# Patient Record
Sex: FEMALE | Race: WHITE
[De-identification: names, ages, dates, MRNs, and addresses within clinical notes are randomized per-mention and may not be internally consistent; named-entity substitution may affect disease eponyms.]

---

## 2017-01-02 ENCOUNTER — HOSPITAL ENCOUNTER (EMERGENCY)
Age: 72
Discharge: HOME | End: 2017-01-02

## 2017-01-02 PROCEDURE — 94640 AIRWAY INHALATION TREATMENT: CPT

## 2017-01-02 PROCEDURE — 99283 EMERGENCY DEPT VISIT LOW MDM: CPT

## 2017-01-12 ENCOUNTER — HOSPITAL ENCOUNTER (OUTPATIENT)
Age: 72
Discharge: HOME | End: 2017-01-12
Payer: MEDICARE

## 2017-01-12 DIAGNOSIS — R05: Primary | ICD-10-CM

## 2017-04-27 ENCOUNTER — HOSPITAL ENCOUNTER (OUTPATIENT)
Age: 72
Discharge: HOME | End: 2017-04-27
Payer: MEDICARE

## 2017-04-27 DIAGNOSIS — Z82.61: ICD-10-CM

## 2017-04-27 DIAGNOSIS — M19.90: ICD-10-CM

## 2017-04-27 DIAGNOSIS — H25.11: Primary | ICD-10-CM

## 2017-04-27 DIAGNOSIS — Z82.3: ICD-10-CM

## 2017-04-27 PROCEDURE — 66984 XCAPSL CTRC RMVL W/O ECP: CPT

## 2017-04-27 PROCEDURE — 08RJ3JZ REPLACEMENT OF RIGHT LENS WITH SYNTHETIC SUBSTITUTE, PERCUTANEOUS APPROACH: ICD-10-PCS | Performed by: OPHTHALMOLOGY

## 2017-05-11 ENCOUNTER — HOSPITAL ENCOUNTER (OUTPATIENT)
Age: 72
Discharge: HOME | End: 2017-05-11
Payer: MEDICARE

## 2017-05-11 DIAGNOSIS — K90.0: ICD-10-CM

## 2017-05-11 DIAGNOSIS — H25.12: Primary | ICD-10-CM

## 2017-05-11 DIAGNOSIS — M19.90: ICD-10-CM

## 2017-05-11 DIAGNOSIS — Z90.710: ICD-10-CM

## 2017-05-11 PROCEDURE — 66984 XCAPSL CTRC RMVL W/O ECP: CPT

## 2017-05-11 PROCEDURE — 08RK3JZ REPLACEMENT OF LEFT LENS WITH SYNTHETIC SUBSTITUTE, PERCUTANEOUS APPROACH: ICD-10-PCS | Performed by: OPHTHALMOLOGY

## 2017-06-21 ENCOUNTER — HOSPITAL ENCOUNTER (OUTPATIENT)
Dept: HOSPITAL 76 - DI | Age: 72
Discharge: HOME | End: 2017-06-21
Attending: FAMILY MEDICINE
Payer: MEDICARE

## 2017-06-21 DIAGNOSIS — M85.88: Primary | ICD-10-CM

## 2017-06-21 PROCEDURE — 77080 DXA BONE DENSITY AXIAL: CPT

## 2017-06-21 PROCEDURE — 77081 DXA BONE DENSITY APPENDICULR: CPT

## 2017-06-22 NOTE — DEXA REPORT
DEXA BONE MINERAL DENSITY SCAN: 06/21/2017



CLINICAL HISTORY: A 72-year-old postmenopausal female. 



TECHNIQUE: Dual energy x-ray absorptiometry (DXA) was performed on a [employer] 
system. Regions measured are the AP spine, femoral neck, and, if needed, 
forearm.



COMPARISON: None. In accordance with the International Society for Clinical 
Densitometry (ISCD) guidelines, data from previous exams may be reanalyzed 
using current recommendations and techniques. This is done to allow a more 
accurate basis for comparison with the current study.



The data for the hip is as follows:









REGION BMD (g/cm/cm) T-SCORE Z-SCORE

 

Neck 0.743 -2.1 -0.4

 

TOTAL 0.842 -1.3 0.2





NOTE: The femoral neck or total proximal femur, whichever is lowest, is used 
for classification.



The data for the forearm is as follows:







REGION BMD (g/cm/cm) T-SCORE Z-SCORE

 

1/3 0.868 -0.1 2.0





NOTE: The 33% radius of the nondominant forearm is used for classification.



THE FEMORAL NECK T-SCORE IS -2.1. THIS IS COMPATIBLE WITH OSTEOPENIA ACCORDING 
TO THE WORLD HEALTH ORGANIZATION GUIDELINES.



TOTAL HIP T-SCORE IS -1.3. THIS IS COMPATIBLE WITH OSTEOPENIA ACCORDING TO THE 
WORLD HEALTH ORGANIZATION GUIDELINES. 



THE RADIUS UD T-SCORE IS -2.1. THIS INDICATES OSTEOPENIA ACCORDING TO THE WORLD 
HEALTH ORGANIZATION GUIDELINES.





IMPRESSION: 



THE WHO CLASSIFICATION BASED ON THE INTERNATIONAL REFERENCE STANDARD IS 
OSTEOPENIA. THE PATIENT HAS INCREASED FRACTURE RISK. 



RECOMMENDATION: Patients with diagnosis of osteoporosis or osteopenia should 
have regular bone mineral density assessment. For those eligible for Medicare, 
routine testing is allowed once every 2 years. Testing frequency can be 
increased for patients who have rapidly progressing disease or for those who 
are receiving medical therapy to restore bone mass. 



COMMENT: World Health Organization (WHO) definitions for osteoporosis and 
osteopenia:

NORMAL BMD: T-score at -1.0 or higher, fracture risk is low.

OSTEOPENIA BMD: T-score between -1.0 and -2.5, fracture risk is increased.

OSTEOPOROSIS BMD: T-score at -2.5 or lower, fracture risk high.



National Osteoporosis Foundation recommends:

1. Obtain adequate dietary calcium (at least 1200 mg per day) and vitamin D (400
-800 international units per day).

2. Participate, as appropriate, in regular weightbearing and muscle-
strengthening exercise. 

3. Avoid tobacco use and reduce alcohol and caffeine intake. 

4. For more detailed information see the website at www.NOF.org.
MTDD

## 2017-07-05 ENCOUNTER — HOSPITAL ENCOUNTER (OUTPATIENT)
Age: 72
Discharge: HOME | End: 2017-07-05
Payer: MEDICARE

## 2017-07-05 DIAGNOSIS — Z12.31: Primary | ICD-10-CM

## 2017-12-13 ENCOUNTER — HOSPITAL ENCOUNTER (OUTPATIENT)
Dept: HOSPITAL 76 - LAB.R | Age: 72
Discharge: HOME | End: 2017-12-13
Attending: FAMILY MEDICINE
Payer: MEDICARE

## 2017-12-13 DIAGNOSIS — N39.0: Primary | ICD-10-CM

## 2017-12-13 PROCEDURE — 87086 URINE CULTURE/COLONY COUNT: CPT

## 2018-01-09 ENCOUNTER — HOSPITAL ENCOUNTER (OUTPATIENT)
Dept: HOSPITAL 76 - DI | Age: 73
Discharge: HOME | End: 2018-01-09
Attending: FAMILY MEDICINE
Payer: MEDICARE

## 2018-01-09 DIAGNOSIS — R13.10: Primary | ICD-10-CM

## 2018-01-09 PROCEDURE — 74230 X-RAY XM SWLNG FUNCJ C+: CPT

## 2018-01-09 PROCEDURE — 92611 MOTION FLUOROSCOPY/SWALLOW: CPT

## 2018-01-09 NOTE — XRAY REPORT
DATE OF SERVICE: 01/09/2018

 

MODIFIED BARIUM SWALLOW:  01/09/2018

 

CLINICAL INDICATION:  Dysphagia.

 

Various consistencies of barium were prepared and administered in conjunction with Speech Pathology. 
 There 

was no evidence of penetration or aspiration with any administered consistency.  Please also refer to
 full 

report from Speech Pathology.

 

IMPRESSION:  No evidence of penetration or aspiration.

 

FLUOROSCOPY TIME:  45 seconds; 1 spot image obtained (cine fluoroscopy recorded).

 

 

DD: 01/09/2018 10:20

TD: 01/09/2018 19:36

Job #: [id number]

## 2018-09-06 ENCOUNTER — HOSPITAL ENCOUNTER (EMERGENCY)
Dept: HOSPITAL 76 - ED | Age: 73
Discharge: HOME | End: 2018-09-06
Payer: MEDICARE

## 2018-09-06 VITALS — SYSTOLIC BLOOD PRESSURE: 124 MMHG | DIASTOLIC BLOOD PRESSURE: 66 MMHG

## 2018-09-06 DIAGNOSIS — Z87.891: ICD-10-CM

## 2018-09-06 DIAGNOSIS — M26.69: Primary | ICD-10-CM

## 2018-09-06 PROCEDURE — 99283 EMERGENCY DEPT VISIT LOW MDM: CPT

## 2019-01-02 ENCOUNTER — HOSPITAL ENCOUNTER (OUTPATIENT)
Dept: HOSPITAL 76 - LAB.WCP | Age: 74
Discharge: HOME | End: 2019-01-02
Attending: FAMILY MEDICINE
Payer: MEDICARE

## 2019-01-02 DIAGNOSIS — F41.9: ICD-10-CM

## 2019-01-02 DIAGNOSIS — K90.0: Primary | ICD-10-CM

## 2019-01-02 DIAGNOSIS — R20.0: ICD-10-CM

## 2019-01-02 LAB
ALBUMIN DIAFP-MCNC: 4.3 G/DL (ref 3.2–5.5)
ALBUMIN/GLOB SERPL: 1.5 {RATIO} (ref 1–2.2)
ALP SERPL-CCNC: 77 IU/L (ref 42–121)
ALT SERPL W P-5'-P-CCNC: 33 IU/L (ref 10–60)
ANION GAP SERPL CALCULATED.4IONS-SCNC: 8 MMOL/L (ref 6–13)
AST SERPL W P-5'-P-CCNC: 31 IU/L (ref 10–42)
BASOPHILS NFR BLD AUTO: 0.1 10^3/UL (ref 0–0.1)
BASOPHILS NFR BLD AUTO: 1.2 %
BILIRUB BLD-MCNC: 0.9 MG/DL (ref 0.2–1)
BUN SERPL-MCNC: 23 MG/DL (ref 6–20)
CALCIUM UR-MCNC: 9.2 MG/DL (ref 8.5–10.3)
CHLORIDE SERPL-SCNC: 107 MMOL/L (ref 101–111)
CO2 SERPL-SCNC: 25 MMOL/L (ref 21–32)
CREAT SERPLBLD-SCNC: 0.8 MG/DL (ref 0.4–1)
EOSINOPHIL # BLD AUTO: 0.4 10^3/UL (ref 0–0.7)
EOSINOPHIL NFR BLD AUTO: 6.2 %
ERYTHROCYTE [DISTWIDTH] IN BLOOD BY AUTOMATED COUNT: 13.7 % (ref 12–15)
GFRSERPLBLD MDRD-ARVRAT: 70 ML/MIN/{1.73_M2} (ref 89–?)
GLOBULIN SER-MCNC: 2.8 G/DL (ref 2.1–4.2)
GLUCOSE SERPL-MCNC: 98 MG/DL (ref 70–100)
HGB UR QL STRIP: 15.2 G/DL (ref 12–16)
LYMPHOCYTES # SPEC AUTO: 1.5 10^3/UL (ref 1.5–3.5)
LYMPHOCYTES NFR BLD AUTO: 23.4 %
MCH RBC QN AUTO: 32.3 PG (ref 27–31)
MCHC RBC AUTO-ENTMCNC: 33.7 G/DL (ref 32–36)
MCV RBC AUTO: 95.9 FL (ref 81–99)
MONOCYTES # BLD AUTO: 0.7 10^3/UL (ref 0–1)
MONOCYTES NFR BLD AUTO: 10.5 %
NEUTROPHILS # BLD AUTO: 3.7 10^3/UL (ref 1.5–6.6)
NEUTROPHILS # SNV AUTO: 6.3 X10^3/UL (ref 4.8–10.8)
NEUTROPHILS NFR BLD AUTO: 58.7 %
PDW BLD AUTO: 8.9 FL (ref 7.9–10.8)
PLATELET # BLD: 228 10^3/UL (ref 130–450)
PROT SPEC-MCNC: 7.1 G/DL (ref 6.7–8.2)
RBC MAR: 4.72 10^6/UL (ref 4.2–5.4)
SODIUM SERPLBLD-SCNC: 140 MMOL/L (ref 135–145)

## 2019-01-02 PROCEDURE — 80053 COMPREHEN METABOLIC PANEL: CPT

## 2019-01-02 PROCEDURE — 85025 COMPLETE CBC W/AUTO DIFF WBC: CPT

## 2019-01-02 PROCEDURE — 36415 COLL VENOUS BLD VENIPUNCTURE: CPT

## 2019-01-02 PROCEDURE — 84443 ASSAY THYROID STIM HORMONE: CPT

## 2019-01-15 ENCOUNTER — HOSPITAL ENCOUNTER (EMERGENCY)
Dept: HOSPITAL 76 - ED | Age: 74
Discharge: HOME | End: 2019-01-15
Payer: MEDICARE

## 2019-01-15 VITALS — DIASTOLIC BLOOD PRESSURE: 65 MMHG | SYSTOLIC BLOOD PRESSURE: 121 MMHG

## 2019-01-15 DIAGNOSIS — T78.40XA: Primary | ICD-10-CM

## 2019-01-15 LAB
ALBUMIN DIAFP-MCNC: 4 G/DL (ref 3.2–5.5)
ALBUMIN/GLOB SERPL: 1.4 {RATIO} (ref 1–2.2)
ALP SERPL-CCNC: 64 IU/L (ref 42–121)
ALT SERPL W P-5'-P-CCNC: 24 IU/L (ref 10–60)
ANION GAP SERPL CALCULATED.4IONS-SCNC: 9 MMOL/L (ref 6–13)
AST SERPL W P-5'-P-CCNC: 29 IU/L (ref 10–42)
BASOPHILS NFR BLD AUTO: 0 10^3/UL (ref 0–0.1)
BASOPHILS NFR BLD AUTO: 0.2 %
BILIRUB BLD-MCNC: 0.8 MG/DL (ref 0.2–1)
BUN SERPL-MCNC: 18 MG/DL (ref 6–20)
CALCIUM UR-MCNC: 9 MG/DL (ref 8.5–10.3)
CHLORIDE SERPL-SCNC: 107 MMOL/L (ref 101–111)
CO2 SERPL-SCNC: 22 MMOL/L (ref 21–32)
CREAT SERPLBLD-SCNC: 0.8 MG/DL (ref 0.4–1)
EOSINOPHIL # BLD AUTO: 0 10^3/UL (ref 0–0.7)
EOSINOPHIL NFR BLD AUTO: 0.3 %
ERYTHROCYTE [DISTWIDTH] IN BLOOD BY AUTOMATED COUNT: 14 % (ref 12–15)
GFRSERPLBLD MDRD-ARVRAT: 70 ML/MIN/{1.73_M2} (ref 89–?)
GLOBULIN SER-MCNC: 2.9 G/DL (ref 2.1–4.2)
GLUCOSE SERPL-MCNC: 107 MG/DL (ref 70–100)
HGB UR QL STRIP: 15 G/DL (ref 12–16)
LIPASE SERPL-CCNC: 48 U/L (ref 22–51)
LYMPHOCYTES # SPEC AUTO: 1.1 10^3/UL (ref 1.5–3.5)
LYMPHOCYTES NFR BLD AUTO: 8.1 %
MCH RBC QN AUTO: 32.1 PG (ref 27–31)
MCHC RBC AUTO-ENTMCNC: 34.1 G/DL (ref 32–36)
MCV RBC AUTO: 94.2 FL (ref 81–99)
MONOCYTES # BLD AUTO: 0.7 10^3/UL (ref 0–1)
MONOCYTES NFR BLD AUTO: 5.3 %
NEUTROPHILS # BLD AUTO: 11.3 10^3/UL (ref 1.5–6.6)
NEUTROPHILS # SNV AUTO: 13.1 X10^3/UL (ref 4.8–10.8)
NEUTROPHILS NFR BLD AUTO: 86.1 %
PDW BLD AUTO: 8.5 FL (ref 7.9–10.8)
PLATELET # BLD: 254 10^3/UL (ref 130–450)
PROT SPEC-MCNC: 6.9 G/DL (ref 6.7–8.2)
RBC MAR: 4.67 10^6/UL (ref 4.2–5.4)
SODIUM SERPLBLD-SCNC: 138 MMOL/L (ref 135–145)

## 2019-01-15 PROCEDURE — 80053 COMPREHEN METABOLIC PANEL: CPT

## 2019-01-15 PROCEDURE — 96375 TX/PRO/DX INJ NEW DRUG ADDON: CPT

## 2019-01-15 PROCEDURE — 99283 EMERGENCY DEPT VISIT LOW MDM: CPT

## 2019-01-15 PROCEDURE — 36415 COLL VENOUS BLD VENIPUNCTURE: CPT

## 2019-01-15 PROCEDURE — 85025 COMPLETE CBC W/AUTO DIFF WBC: CPT

## 2019-01-15 PROCEDURE — 71045 X-RAY EXAM CHEST 1 VIEW: CPT

## 2019-01-15 PROCEDURE — 83690 ASSAY OF LIPASE: CPT

## 2019-01-15 PROCEDURE — 93005 ELECTROCARDIOGRAM TRACING: CPT

## 2019-01-15 PROCEDURE — 96374 THER/PROPH/DIAG INJ IV PUSH: CPT

## 2019-01-15 PROCEDURE — 84484 ASSAY OF TROPONIN QUANT: CPT

## 2019-01-15 NOTE — ED PHYSICIAN DOCUMENTATION
History of Present Illness





- Stated complaint


Stated Complaint: HIVES/CHEST PX





- Chief complaint


Chief Complaint: Allergic Rx





- History obtained from


History obtained from: Patient





- History of Present Illness


Timing: How many days ago (1)


Pain level max: 6


Pain level now: 4


Severity Comments: moderate


Quality: itchy


Radiates to: none


Improved by: nothing


Worsened by: nothing


Associated symptoms: none, no difficulty breathing, abdominal pain or vomiting





Review of Systems


Ten Systems: 10 systems reviewed and negative


Constitutional: reports: Reviewed and negative


Eyes: reports: Reviewed and negative


Ears: reports: Reviewed and negative


Nose: reports: Reviewed and negative


Throat: reports: Reviewed and negative


Cardiac: reports: Reviewed and negative


Respiratory: reports: Reviewed and negative


GI: reports: Reviewed and negative


: reports: Reviewed and negative


Skin: reports: Reviewed and negative


Musculoskeletal: reports: Reviewed and negative


Neurologic: reports: Reviewed and negative


Psychiatric: reports: Reviewed and negative


Endocrine: reports: Reviewed and negative


Immunocompromised: reports: Reviewed and negative





PD PAST MEDICAL HISTORY





- Past Medical History


Past Medical History: Yes


Cardiovascular: None


Respiratory: COPD, Pneumonia


Endocrine/Autoimmune: None


GI: None


: None


HEENT: None


Psych: None


Musculoskeletal: None


Derm: None





- Past Surgical History


Past Surgical History: Yes


General: Appendectomy


Ortho: Arthroscopic surgery


/GYN: Hysterectomy


HEENT: Cataracts





- Present Medications


Home Medications: 


                                Ambulatory Orders











 Medication  Instructions  Recorded  Confirmed


 


Sertraline [Zoloft] 150 mg PO DAILY 04/16/15 05/11/17


 


Dexamethasone [Decadron] 4 mg PO DAILY #5 tablet 09/06/18 


 


Naproxen 375 mg PO BID #20 tablet 09/06/18 


 


oxyCODONE [Roxicodone] 5 mg PO Q4-6H PRN #15 tablet 09/06/18 


 


Famotidine [Pepcid] 20 mg PO BID #30 tablet 01/15/19 


 


diphenhydrAMINE [Benadryl] 25 mg PO Q4HR #30 capsule 01/15/19 


 


predniSONE [Prednisone] 60 mg PO DAILY #15 tablet 01/15/19 














- Allergies


Allergies/Adverse Reactions: 


                                    Allergies











Allergy/AdvReac Type Severity Reaction Status Date / Time


 


adhesive tape Allergy  Rash Verified 01/15/19 12:51


 


acetaminophen [From Tylenol] AdvReac  Nausea Verified 01/15/19 12:51


 


ibuprofen AdvReac  Nausea Verified 01/15/19 12:51














- Living Situation


Living Situation: reports: With family


Living Arrangement: reports: At home





- Social History


Does the pt smoke?: No


Smoking Status: Never smoker


Does the pt drink ETOH?: Yes


Does the pt have substance abuse?: No





- Family History


Family history: reports: Other (Reviewed and not pertinent)





- Immunizations


Immunizations are current?: Yes





- POLST


Patient has POLST: No





PD ED PE NORMAL





- Vitals


Vital signs reviewed: Yes





- General


General: Alert and oriented X 3, No acute distress





- HEENT


HEENT: PERRL





- Neck


Neck: Supple, no meningeal sign





- Cardiac


Cardiac: RRR, No murmur





- Respiratory


Respiratory: Clear bilaterally





- Abdomen


Abdomen: Normal bowel sounds, Soft, Non tender, Non distended





- Derm


Derm: Warm and dry, Other (Urticaria on whole body)





- Extremities


Extremities: No deformity





- Neuro


Neuro: Alert and oriented X 3





- Psych


Psych: Normal mood, Normal affect





Results





- Vitals


Vitals: 


                               Vital Signs - 24 hr











  01/15/19 01/15/19 01/15/19





  12:37 13:06 13:30


 


Temperature 36.9 C  


 


Heart Rate 88 79 81


 


Respiratory 18 16 16





Rate   


 


Blood Pressure 134/80 H 109/65 105/90 H


 


O2 Saturation 98 100 96














  01/15/19 01/15/19





  14:02 14:30


 


Temperature  


 


Heart Rate 81 69


 


Respiratory 18 18





Rate  


 


Blood Pressure 110/60 121/65


 


O2 Saturation 96 93








                                     Oxygen











O2 Source                      Room air

















- EKG (time done)


  ** 1241


Rate: Rate (enter#) (78)


Rhythm: NSR


Axis: Normal


Intervals: Normal ND


QRS: Normal


Ischemia: Normal ST segments.  No: T wave inversion





- Labs


Labs: 


                                Laboratory Tests











  01/15/19 01/15/19 01/15/19





  12:44 12:44 12:44


 


WBC  13.1 H  


 


RBC  4.67  


 


Hgb  15.0  


 


Hct  44.0  


 


MCV  94.2  


 


MCH  32.1 H  


 


MCHC  34.1  


 


RDW  14.0  


 


Plt Count  254  


 


MPV  8.5  


 


Neut # (Auto)  11.3 H  


 


Lymph # (Auto)  1.1 L  


 


Mono # (Auto)  0.7  


 


Eos # (Auto)  0.0  


 


Baso # (Auto)  0.0  


 


Absolute Nucleated RBC  0.00  


 


Nucleated RBC %  0.0  


 


Sodium   138 


 


Potassium   3.9 


 


Chloride   107 


 


Carbon Dioxide   22 


 


Anion Gap   9.0 


 


BUN   18 


 


Creatinine   0.8 


 


Estimated GFR (MDRD)   70 L 


 


Glucose   107 H 


 


Calcium   9.0 


 


Total Bilirubin   0.8 


 


AST   29 


 


ALT   24 


 


Alkaline Phosphatase   64 


 


Troponin I    < 0.04


 


Total Protein   6.9 


 


Albumin   4.0 


 


Globulin   2.9 


 


Albumin/Globulin Ratio   1.4 


 


Lipase   48 














PD MEDICAL DECISION MAKING





- ED course


Complexity details: reviewed old records, reviewed results, re-evaluated 

patient, considered differential, d/w patient, d/w family


ED course: 





73-year-old female with allergic reaction.  Patient with urticaria but no airway

involvement.Patient improved with Solu-Medrol, Benadryl, Pepcid.  Discharged on 

prednisone, Benadryl, Pepcid.  Return precautions reviewed.  No evidence of 

anaphylaxis at this time.





Departure





- Departure


Disposition: 01 Home, Self Care


Clinical Impression: 


 Allergic reaction





Condition: Good


Instructions:  ED Allergic Reaction General Other


Follow-Up: 


Carmela Alcocer MD [Primary Care Provider] - 


Prescriptions: 


diphenhydrAMINE [Benadryl] 25 mg PO Q4HR #30 capsule


Famotidine [Pepcid] 20 mg PO BID #30 tablet


predniSONE [Prednisone] 60 mg PO DAILY #15 tablet


Comments: 


Stop taking current steroids and zirtec. Take new prescriptions instead. Follow 

up with PCP within 24 hours. Return with mouth or lip swelling or any difficulty

 breathing. 


Discharge Date/Time: 01/15/19 15:04

## 2019-01-15 NOTE — XRAY REPORT
Reason:  Chest Pain

Procedure Date:  01/15/2019   

Accession Number:  [id number] / [id number]                    

Procedure:  XR  - Chest 1 View X-Ray CPT Code:  35330

 

FULL RESULT:

 

 

EXAM:

CHEST RADIOGRAPHY

 

EXAM DATE: 1/15/2019 01:15 PM.

 

CLINICAL HISTORY: Chest Pain.

 

COMPARISON: 05/17/2017.

 

TECHNIQUE: 1 view.

 

FINDINGS:

Lungs/Pleura: No focal opacities evident. No pleural effusion. No 

pneumothorax. Calcified granuloma

 

Mediastinum: Within exam limitations, the cardiomediastinal contour is 

normal. Calcified mediastinal and hilar lymph nodes.

 

Other: None.

 

IMPRESSION: No active cardiopulmonary disease

 

[facility]

## 2022-05-20 ENCOUNTER — HOSPITAL ENCOUNTER (OUTPATIENT)
Dept: HOSPITAL 76 - LAB.N | Age: 77
Discharge: HOME | End: 2022-05-20
Attending: PHYSICIAN ASSISTANT
Payer: MEDICARE

## 2022-05-20 DIAGNOSIS — R30.0: Primary | ICD-10-CM

## 2022-05-20 PROCEDURE — 87086 URINE CULTURE/COLONY COUNT: CPT

## 2023-03-07 ENCOUNTER — HOSPITAL ENCOUNTER (OUTPATIENT)
Dept: HOSPITAL 76 - ED | Age: 78
Setting detail: OBSERVATION
LOS: 1 days | Discharge: HOME | End: 2023-03-08
Attending: INTERNAL MEDICINE | Admitting: INTERNAL MEDICINE
Payer: MEDICARE

## 2023-03-07 DIAGNOSIS — K92.1: Primary | ICD-10-CM

## 2023-03-07 DIAGNOSIS — D64.9: ICD-10-CM

## 2023-03-07 DIAGNOSIS — K90.0: ICD-10-CM

## 2023-03-07 DIAGNOSIS — M54.50: ICD-10-CM

## 2023-03-07 DIAGNOSIS — K25.9: ICD-10-CM

## 2023-03-07 LAB
ALBUMIN DIAFP-MCNC: 3.4 G/DL (ref 3.2–5.5)
ALBUMIN/GLOB SERPL: 1.2 {RATIO} (ref 1–2.2)
ALP SERPL-CCNC: 64 IU/L (ref 42–121)
ALT SERPL W P-5'-P-CCNC: 15 IU/L (ref 10–60)
ANION GAP SERPL CALCULATED.4IONS-SCNC: 8 MMOL/L (ref 6–13)
AST SERPL W P-5'-P-CCNC: 21 IU/L (ref 10–42)
BASOPHILS NFR BLD AUTO: 0.1 10^3/UL (ref 0–0.1)
BASOPHILS NFR BLD AUTO: 0.8 %
BILIRUB BLD-MCNC: 0.4 MG/DL (ref 0.2–1)
BUN SERPL-MCNC: 23 MG/DL (ref 6–20)
CALCIUM UR-MCNC: 8.7 MG/DL (ref 8.5–10.3)
CHLORIDE SERPL-SCNC: 107 MMOL/L (ref 101–111)
CO2 SERPL-SCNC: 24 MMOL/L (ref 21–32)
CREAT SERPLBLD-SCNC: 0.8 MG/DL (ref 0.4–1)
EOSINOPHIL # BLD AUTO: 0.4 10^3/UL (ref 0–0.7)
EOSINOPHIL NFR BLD AUTO: 4.5 %
ERYTHROCYTE [DISTWIDTH] IN BLOOD BY AUTOMATED COUNT: 16.2 % (ref 12–15)
GFRSERPLBLD MDRD-ARVRAT: 69 ML/MIN/{1.73_M2} (ref 89–?)
GLOBULIN SER-MCNC: 2.9 G/DL (ref 2.1–4.2)
GLUCOSE SERPL-MCNC: 121 MG/DL (ref 70–100)
HCT VFR BLD AUTO: 24.6 % (ref 37–47)
HGB UR QL STRIP: 7.3 G/DL (ref 12–16)
LYMPHOCYTES # SPEC AUTO: 1.8 10^3/UL (ref 1.5–3.5)
LYMPHOCYTES NFR BLD AUTO: 21 %
MCH RBC QN AUTO: 25.4 PG (ref 27–31)
MCHC RBC AUTO-ENTMCNC: 29.7 G/DL (ref 32–36)
MCV RBC AUTO: 85.7 FL (ref 81–99)
MONOCYTES # BLD AUTO: 0.8 10^3/UL (ref 0–1)
MONOCYTES NFR BLD AUTO: 9.5 %
NEUTROPHILS # BLD AUTO: 5.5 10^3/UL (ref 1.5–6.6)
NEUTROPHILS # SNV AUTO: 8.7 X10^3/UL (ref 4.8–10.8)
NEUTROPHILS NFR BLD AUTO: 63.9 %
NRBC # BLD AUTO: 0 /100WBC
NRBC # BLD AUTO: 0 X10^3/UL
PDW BLD AUTO: 9.5 FL (ref 7.9–10.8)
PLATELET # BLD: 280 10^3/UL (ref 130–450)
POTASSIUM SERPL-SCNC: 3.6 MMOL/L (ref 3.5–5)
PROT SPEC-MCNC: 6.3 G/DL (ref 6.7–8.2)
RBC MAR: 2.87 10^6/UL (ref 4.2–5.4)
SODIUM SERPLBLD-SCNC: 139 MMOL/L (ref 135–145)

## 2023-03-07 PROCEDURE — 83880 ASSAY OF NATRIURETIC PEPTIDE: CPT

## 2023-03-07 PROCEDURE — 84484 ASSAY OF TROPONIN QUANT: CPT

## 2023-03-07 PROCEDURE — 99285 EMERGENCY DEPT VISIT HI MDM: CPT

## 2023-03-07 PROCEDURE — 93005 ELECTROCARDIOGRAM TRACING: CPT

## 2023-03-07 PROCEDURE — 86901 BLOOD TYPING SEROLOGIC RH(D): CPT

## 2023-03-07 PROCEDURE — 96374 THER/PROPH/DIAG INJ IV PUSH: CPT

## 2023-03-07 PROCEDURE — 43239 EGD BIOPSY SINGLE/MULTIPLE: CPT

## 2023-03-07 PROCEDURE — 94640 AIRWAY INHALATION TREATMENT: CPT

## 2023-03-07 PROCEDURE — 96376 TX/PRO/DX INJ SAME DRUG ADON: CPT

## 2023-03-07 PROCEDURE — 36430 TRANSFUSION BLD/BLD COMPNT: CPT

## 2023-03-07 PROCEDURE — 80053 COMPREHEN METABOLIC PANEL: CPT

## 2023-03-07 PROCEDURE — 85025 COMPLETE CBC W/AUTO DIFF WBC: CPT

## 2023-03-07 PROCEDURE — 86850 RBC ANTIBODY SCREEN: CPT

## 2023-03-07 PROCEDURE — 96375 TX/PRO/DX INJ NEW DRUG ADDON: CPT

## 2023-03-07 PROCEDURE — 85018 HEMOGLOBIN: CPT

## 2023-03-07 PROCEDURE — 97162 PT EVAL MOD COMPLEX 30 MIN: CPT

## 2023-03-07 PROCEDURE — 74177 CT ABD & PELVIS W/CONTRAST: CPT

## 2023-03-07 PROCEDURE — 86900 BLOOD TYPING SEROLOGIC ABO: CPT

## 2023-03-07 PROCEDURE — 36415 COLL VENOUS BLD VENIPUNCTURE: CPT

## 2023-03-07 PROCEDURE — 86920 COMPATIBILITY TEST SPIN: CPT

## 2023-03-07 PROCEDURE — 80048 BASIC METABOLIC PNL TOTAL CA: CPT

## 2023-03-07 PROCEDURE — 71046 X-RAY EXAM CHEST 2 VIEWS: CPT

## 2023-03-07 RX ADMIN — SODIUM CHLORIDE, PRESERVATIVE FREE SCH ML: 5 INJECTION INTRAVENOUS at 16:50

## 2023-03-07 RX ADMIN — SODIUM CHLORIDE SCH MG: 9 INJECTION, SOLUTION INTRAVENOUS at 21:10

## 2023-03-07 NOTE — XRAY REPORT
PROCEDURE:  Chest 2 View X-Ray

 

INDICATIONS:  cough with soa

 

TECHNIQUE:  2 views of the chest were acquired.  

 

COMPARISON:  None.

 

FINDINGS:  

 

Surgical changes and devices:  None.  

 

Lungs and pleura:  No pleural effusions or pneumothorax.  Lungs are clear.  Right lower lobe calcifie
d granuloma.

 

Mediastinum:  Mediastinal contours are normal.  Heart size is normal.  Calcified hilar lymph nodes an
d mediastinal lymph nodes.

 

Bones and chest wall:  No suspicious bony abnormalities.  Soft tissues appear unremarkable.  

 

IMPRESSION:  

No acute cardiopulmonary process.

Sequela of prior granulomatous disease.

 

Reviewed by: Saw Roberts on 3/7/2023 11:58 AM PST

Approved by: Saw Roberts on 3/7/2023 11:58 AM [facility]

 

 

Station ID:  [id number]

## 2023-03-07 NOTE — CONSULTATION NOTE
Referring Provider


Consult Date: 03/07/23





Chief Complaint





- Chief Complaint


Chief Complaint: weak and short of breath





History of Present Illness





- History Obtained From


Records Reviewed: yes


History obtained from: pt and ED MD


Exam Limitations: none





- History of Present Illness


HPI Comment/Other: 





seen in the ED.  history darker stool for several weeks.  found to be anemic.  

no significant gi symptoms.  she has a chronic cough which ENT thought to be 

silent reflux.  she has been on prilosec.  she takes occasional aleve.  she is 

up to date on colon cancer screening.  colonoscopy 11/2014 [place] 2 hyperplastic

polyps





History





- Past Medical History


Cardiovascular: reports: None


Respiratory: reports: Pneumonia


Endocrine/Autoimmune: reports: None


GI: reports: None


: reports: None


HEENT: reports: None


Psych: reports: None


Musculoskeletal: reports: None


Derm: reports: None


MRSA Hx?: No


Other Past Medical History: NERVE PAIN, CELIAC





- Past Surgical History


General: reports: Appendectomy


Ortho: reports: Arthroscopic surgery, Carpal Tunnel surgery


/GYN: reports: Hysterectomy


HEENT: reports: Cataracts





- POLST


Patient has POLST: No





Meds/Allgy





- Home Medications


Home Medications: 


                                Ambulatory Orders











 Medication  Instructions  Recorded  Confirmed


 


Carisoprodol [Soma] 350 mg ORAL QPM PRN 03/07/23 03/07/23


 


Cetirizine [ZyrTEC] 10 mg PO DAILY 03/07/23 03/07/23


 


Dicyclomine [Bentyl] 10 mg PO DAILY PRN 03/07/23 03/07/23


 


Gabapentin [Neurontin] 300 mg PO HS 03/07/23 03/07/23


 


Omeprazole Magnesium [Prilosec] 10 mg PO DAILY 03/07/23 03/07/23


 


Venlafaxine ER [Effexor ER] 75 mg PO QPM 03/07/23 03/07/23














- Allergies


Allergies/Adverse Reactions: 


                                    Allergies











Allergy/AdvReac Type Severity Reaction Status Date / Time


 


adhesive tape Allergy  Rash Verified 03/07/23 11:08


 


nitrofurantoin Allergy  Unknown Verified 03/07/23 11:11


 


Sulfa (Sulfonamide Allergy  Unknown Verified 03/07/23 11:08





Antibiotics)     


 


acetaminophen [From Tylenol] AdvReac  Nausea Verified 03/07/23 11:08


 


ibuprofen AdvReac  Nausea Verified 03/07/23 11:08














Review of Systems





- Other Findings


Other Findings: 





denies pain and swallowing, much trouble swallowing, wt loss, etc


10 pt ros as above otherwise unremarkable





Exam





- Vital Signs


Reviewed Vital Signs: Yes


Vital Signs: 





                                Vital Signs x48h











  Temp Pulse Pulse Resp BP BP Pulse Ox


 


 03/07/23 16:24       


 


 03/07/23 16:21  36.7 C   102 H  20   138/56 H  100


 


 03/07/23 15:36   85     


 


 03/07/23 14:10   85   20  119/61   97


 


 03/07/23 11:01  36.6 C  87   24  105/57 L   100














  O2 Flow Rate


 


 03/07/23 16:24  13


 


 03/07/23 16:21 


 


 03/07/23 15:36  13


 


 03/07/23 14:10 


 


 03/07/23 11:01 














- Physical Exam


General Appearance: positive: No acute distress, Alert


Eyes Bilateral: positive: PERRL, EOMI


ENT: positive: No signs of dehydration


Neck: positive: No JVD, Trachea midline


Respiratory: positive: No respiratory distress


Abdomen: positive: Non-tender, No distention


Neurologic/Psychiatric: positive: Oriented x3





Conclusion/Plan





- Problem List


(1) Symptomatic anemia


Conclusion/Plan: 


she is up to date on colonoscopy.  next due 11/2024.  





plan egd 3/8/2023 730 am.  parq held and verbal consent obtained





will get written consent in am.








- Lab Results


Fish Bones: 


                                 03/07/23 17:00





                                 03/07/23 14:53

## 2023-03-07 NOTE — HISTORY & PHYSICAL EXAMINATION
Chief Complaint





- Chief Complaint


Chief Complaint: SOB, 4 weeks of black stools





History of Present Illness





- Admitted From


Admitted From:: ED





- History Obtained From


History obtained from: ED provider and the pt





- History of Present Illness


HPI Comment/Other: 





This is a 77-year-old white female with a history of celiac disease for which 

she is on a gluten-free diet and has a history of 5 back surgeries done 6 years 

ago which left her with low back pain radiating to the left buttock and down her

posterior left leg and also numbness of the lateral left hip area radiating 

across her left thigh to her medial left knee and down the front lower leg and 

dorsal foot.  She used to be a  and  and had 

to lose her job because of the problems with her back and left leg.


2 days ago she choked on something and had to push against a countertop to expel

the food.  She has been short of breath for the past 3 days and reported "panic 

attacks" that could last an hour.  She came to a walk-in clinic with these 

complaints and was told to go to the ER.  In the ED her labs show a hemoglobin 

of 7.3 and on review of systems she was asked about her bowel movements.  She 

reported that they have been black for about 4 weeks.  She also reports 

up-and-down lower abdominal pain with gassy feelings and cramping but no 

diarrhea.  She denies any nausea and vomiting.  The ED provider has reached out 

to general surgery and an EGD is planned for tomorrow.  The ED provider reached 

out to me on the Hospitalist Team and we discussed her management going forward.

 The patient will be placed in Observation status and evaluated for abdominal 

pain, symptomatic anemia probably causing the shortness of breath and evaluated 

for cause for black stools.








History





- Past Medical History


Cardiovascular: reports: None


Respiratory: reports: Pneumonia


Neuro: reports: Peripheral neuropathy (related to 5 back surg, now has numbness 

in dermatomal distribution L leg)


Endocrine/Autoimmune: reports: None


GI: reports: Other (Celiac disease)


GYN: reports: None


: reports: None


HEENT: reports: None


Psych: reports: None


Musculoskeletal: reports: Chronic back pain


Derm: reports: None


MRSA Hx?: No


Other Past Medical History: NERVE PAIN, CELIAC disease





- Past Surgical History


General: reports: Appendectomy


Ortho: reports: Arthroscopic surgery, Carpal Tunnel surgery, Other (5 back 

surgeries L1-L5)


/GYN: reports: Hysterectomy


HEENT: reports: Cataracts





- Family & Social History


Family History: Mother:  (Mom prob had celiac dis), Father: 


Living arrangement: At home


Living Situation: Alone


Social History Notes: She is retired from  and yoga 

instructor. Does not smoke, she drinks rare alcohol.  She has 2 adult daughters 

who visit her occaisionally





- Substance History


Use: Uses substance without health or social issues: NONE





- POLST


Patient has POLST: No





Meds/Allgy





- Home Medications


Home Medications: 


                                Ambulatory Orders











 Medication  Instructions  Recorded  Confirmed


 


Carisoprodol [Soma] 350 mg ORAL QPM PRN 23


 


Cetirizine [ZyrTEC] 10 mg PO DAILY 23


 


Dicyclomine [Bentyl] 10 mg PO DAILY PRN 23


 


Gabapentin [Neurontin] 300 mg PO HS 23


 


Omeprazole Magnesium [Prilosec] 10 mg PO DAILY 23


 


Venlafaxine ER [Effexor ER] 75 mg PO QPM 23














- Allergies


Allergies/Adverse Reactions: 


                                    Allergies











Allergy/AdvReac Type Severity Reaction Status Date / Time


 


adhesive tape Allergy  Rash Verified 23 11:08


 


gluten Allergy  Cramps Verified 23 07:52


 


nitrofurantoin Allergy  Unknown Verified 23 11:11


 


Sulfa (Sulfonamide Allergy  Unknown Verified 23 11:08





Antibiotics)     


 


acetaminophen [From Tylenol] AdvReac  Nausea Verified 23 11:08


 


ibuprofen AdvReac  Nausea Verified 23 11:08














Review of Systems





- Constitutional


Constitutional: reports: Weakness





- Respiratory


Respiratory: reports: Orthopnea, SOB with exertion





- Gastrointestinal


Gastrointestinal: reports: Black stools





- Musculoskeletal


Musculoskeletal: reports: Muscle pain, Back pain, Limited range of motion, Other

(Numbness L leg in dermatome distribution)





- All Other Systems


All Other Systems: reports: Reviewed and negative





Exam





- Vital Signs


Vital Signs: 





                                Vital Signs x48h











  Temp Pulse Pulse Resp BP BP Pulse Ox


 


 23 16:24       


 


 23 16:21  36.7 C   102 H  20   138/56 H  100


 


 23 15:36   85     


 


 23 14:10   85   20  119/61   97














  O2 Flow Rate


 


 23 16:24  13


 


 23 16:21 


 


 23 15:36  13


 


 23 14:10 














- Physical Exam


General Appearance: positive: No acute distress, Alert


Eyes Bilateral: positive: Normal inspection, EOMI


ENT: positive: No signs of dehydration


Neck: positive: Nml inspection, Thyroid nml, No JVD


Respiratory: positive: No respiratory distress, Breath sounds nml


Cardiovascular: positive: Regular rate & rhythm, No murmur


Abdomen: positive: Non-tender, Nml bowel sounds


Skin: positive: Warm, Dry, Pallor


Extremities: positive: Non-tender, Nml appearance


Neurologic/Psychiatric: positive: Oriented x3, Sensory loss (L lateral hip)





Conclusion/Plan





- Problem List


(1) Symptomatic anemia


Conclusion/Plan: 


Very likely her dyspnea on exertion is from her anemia.  Her last hemoglobin 

done here was 2 years ago and was 15.  Today on labs it is 7.3.


Plan:


We will treat empirically for upper GI bleed from an ulcer, start Protonix twice

daily


We will follow her H/H every 12 hours.


A type and screen has already been done by the ED provider


Would transfuse if hemoglobin falls under 7








(2) Black stools


Conclusion/Plan: 


This suggests an upper GI source of blood loss


Plan:


Empiric treatment for ulcer and gastritis to start








(3) Abdominal pain


Conclusion/Plan: 


Very likely her pain with gassy bloating is from her celiac disease.


Plan:


Because this lower abd pain is recently worse, and it is accompanied with new 

black bowel movements, and because she has not had CT abdomen and pelvis 

imaging, will get a CT scan of the abdomen pelvis


Currently her diet is clear liquids, in preparation for EGD.  When she is able 

to take solids we will order a gluten-free diet








(4) Celiac disease


Conclusion/Plan: 


As per history.


Plan:


Plan a gluten-free diet when she is able to start solids orally








(5) Low back pain potentially associated with radiculopathy


Conclusion/Plan: 


This has been a complaint for several years.  She has had PT and has had 

improvement in the area of numbness but no improvement in pain of the lower back

and left leg.


Plan:


We will continue with her usual gabapentin and other home meds


We will order PT evaluation








- Lab Results


Fish Bones: 


                                 23 06:31





                                 23 06:31

## 2023-03-07 NOTE — ED PHYSICIAN DOCUMENTATION
History of Present Illness





- Stated complaint


Stated Complaint: SOA/DIZZY





- Chief complaint


Chief Complaint: Resp





- History obtained from


History obtained from: Patient





- History of Present Illness


Timing: Today


Pain level max: 0


Pain level now: 0





- Additonal information


Additional information: 





Patient is a 77-year-old female who presents to the emergency department with 

feeling like she has had difficulty breathing for the past 3 to 4 days.  She 

states that on Friday she bent over to  a dog bone when she felt suddenly

short of breath and like she had a panic attack that lasted for approximately an

hour.  She states she calmed herself down but then the next day she was choking 

on a piece of food and slammed her diaphragm against the counter to eject the 

piece of food.  She then went to the walk-in clinic yesterday and they told her 

that she needed a chest x-ray.  She was on her way to get a chest x-ray today 

when she felt short of breath again and so came to the emergency department. She

states that she has used inhalers in the past.  Had histoplasmosis when she was 

younger.  Has granulomatous disease in her lungs.  She does not currently use an

inhaler.  No chest pain.  No abdominal pain.  No vomiting.





When the patient was found to be significantly anemic, she states that she has 

been having black tarry stools for the past 2 weeks.  She has not mentioned this

to anybody.  She states that she had a normal colonoscopy a few years ago.  She 

has never had an endoscopy.  She is on omeprazole





Review of Systems


Constitutional: denies: Fever, Chills


Respiratory: denies: Cough


GI: denies: Nausea, Vomiting, Diarrhea


Skin: denies: Rash


Musculoskeletal: denies: Neck pain, Back pain


Neurologic: denies: Headache





PD PAST MEDICAL HISTORY





- Past Medical History


Past Medical History: Yes


Cardiovascular: None


Respiratory: Pneumonia


Endocrine/Autoimmune: None


GI: None


: None


HEENT: None


Psych: None


Musculoskeletal: None


Derm: None


Other Past Medical History: NERVE PAIN, CELIAC





- Past Surgical History


Past Surgical History: Yes


General: Appendectomy


Ortho: Arthroscopic surgery, Carpal Tunnel surgery


/GYN: Hysterectomy


HEENT: Cataracts





- Present Medications


Home Medications: 


                                Ambulatory Orders











 Medication  Instructions  Recorded  Confirmed


 


Carisoprodol [Soma] 350 mg ORAL QPM PRN 03/07/23 03/07/23


 


Cetirizine [ZyrTEC] 10 mg PO DAILY 03/07/23 03/07/23


 


Dicyclomine [Bentyl] 10 mg PO DAILY PRN 03/07/23 03/07/23


 


Gabapentin [Neurontin] 300 mg PO HS 03/07/23 03/07/23


 


Omeprazole Magnesium [Prilosec] 10 mg PO DAILY 03/07/23 03/07/23


 


Venlafaxine ER [Effexor ER] 75 mg PO QPM 03/07/23 03/07/23














- Allergies


Allergies/Adverse Reactions: 


                                    Allergies











Allergy/AdvReac Type Severity Reaction Status Date / Time


 


adhesive tape Allergy  Rash Verified 03/07/23 11:08


 


nitrofurantoin Allergy  Unknown Verified 03/07/23 11:11


 


Sulfa (Sulfonamide Allergy  Unknown Verified 03/07/23 11:08





Antibiotics)     


 


acetaminophen [From Tylenol] AdvReac  Nausea Verified 03/07/23 11:08


 


ibuprofen AdvReac  Nausea Verified 03/07/23 11:08














- Social History


Does the pt smoke?: No


Smoking Status: Never smoker


Does the pt drink ETOH?: Yes


Does the pt have substance abuse?: No





- Immunizations


Immunizations are current?: Yes





- POLST


Patient has POLST: No





PD ED PE NORMAL





- Vitals


Vital signs reviewed: Yes





- General


General: Alert and oriented X 3, No acute distress





- HEENT


HEENT: PERRL, Moist mucous membranes





- Neck


Neck: Supple, no meningeal sign





- Cardiac


Cardiac: RRR, Strong equal pulses





- Respiratory


Respiratory: No respiratory distress, Clear bilaterally





- Abdomen


Abdomen: Soft, Non tender, Non distended





- Rectal


Rectal: Pt declined





- Derm


Derm: Warm and dry





- Extremities


Extremities: No edema





- Neuro


Neuro: Alert and oriented X 3





- Psych


Psych: Normal mood, Normal affect





Results





- Vitals


Vitals: 


                               Vital Signs - 24 hr











  03/07/23 03/07/23 03/07/23





  11:01 14:10 15:36


 


Temperature 36.6 C  


 


Heart Rate 87 85 85


 


Respiratory 24 20 





Rate   


 


Blood Pressure 105/57 L 119/61 


 


O2 Saturation 100 97 


 


If not protocol   13





: Oxygen Flow,   





liters/minute   








                                     Oxygen











O2 Source                      Room air

















- EKG (time done)


  ** 1446


EKG releavant findings:: 





EKG personally interpreted by author of this note. Relevant findings are:








Rate: Rate (enter#) (8)


Rhythm: NSR


Axis: Normal


Intervals: Normal NE


QRS: Normal


Ischemia: Normal ST segments





- Labs


Labs: 


                                Laboratory Tests











  03/07/23 03/07/23 03/07/23





  14:30 14:53 14:53


 


WBC   8.7 


 


RBC   2.87 L 


 


Hgb   7.3 L 


 


Hct   24.6 L 


 


MCV   85.7 


 


MCH   25.4 L 


 


MCHC   29.7 L 


 


RDW   16.2 H 


 


Plt Count   280 


 


MPV   9.5 


 


Neut # (Auto)   5.5 


 


Lymph # (Auto)   1.8 


 


Mono # (Auto)   0.8 


 


Eos # (Auto)   0.4 


 


Baso # (Auto)   0.1 


 


Absolute Nucleated RBC   0.00 


 


Nucleated RBC %   0.0 


 


Sodium    139


 


Potassium    3.6


 


Chloride    107


 


Carbon Dioxide    24


 


Anion Gap    8.0


 


BUN    23 H


 


Creatinine    0.8


 


Estimated GFR (MDRD)    69 L


 


Glucose    121 H


 


Calcium    8.7


 


Total Bilirubin    0.4


 


AST    21


 


ALT    15


 


Alkaline Phosphatase    64


 


Troponin I High Sens   


 


B-Natriuretic Peptide   


 


Total Protein    6.3 L


 


Albumin    3.4


 


Globulin    2.9


 


Albumin/Globulin Ratio    1.2


 


Blood Type   


 


Blood Type Recheck  O POSITIVE  


 


Antibody Screen   














  03/07/23 03/07/23 03/07/23





  14:53 14:53 15:28


 


WBC   


 


RBC   


 


Hgb   


 


Hct   


 


MCV   


 


MCH   


 


MCHC   


 


RDW   


 


Plt Count   


 


MPV   


 


Neut # (Auto)   


 


Lymph # (Auto)   


 


Mono # (Auto)   


 


Eos # (Auto)   


 


Baso # (Auto)   


 


Absolute Nucleated RBC   


 


Nucleated RBC %   


 


Sodium   


 


Potassium   


 


Chloride   


 


Carbon Dioxide   


 


Anion Gap   


 


BUN   


 


Creatinine   


 


Estimated GFR (MDRD)   


 


Glucose   


 


Calcium   


 


Total Bilirubin   


 


AST   


 


ALT   


 


Alkaline Phosphatase   


 


Troponin I High Sens  4.6  


 


B-Natriuretic Peptide   47 


 


Total Protein   


 


Albumin   


 


Globulin   


 


Albumin/Globulin Ratio   


 


Blood Type    O POSITIVE


 


Blood Type Recheck   


 


Antibody Screen    NEGATIVE














- Rads (name of study)


  ** cxr


Radiology: Final report received, See rad report (cxr)





PD Medical Decision Making





- ED course


Complexity details: reviewed results, re-evaluated patient, considered 

differential, d/w patient


Reviewed Lab Results: 





CBC reveals a normal white blood cell count.  Hemoglobin 7.3/hematocrit 24.6.  

Platelets are normal at 280.  Chemistry shows a normal high-sensitivity 

troponin.  Normal BNP.  Mildly elevated BUN to creatinine ratio 23/0.8.  

Otherwise normal


ED course: 





Patient initially presents with dyspnea. She is found to be significantly 

anemic.  She then related that she has had dark tarry stools for the past few 

weeks.  Has never had a endoscopy but has had a colonoscopy.  Given IV Protonix.

 We will admit for blood transfusion and further work-up.  Discussed the case 

with Dr. Manrique, general surgery who will consult.  Also discussed with Dr. Montenegro, hospitalist who accepts





This document was made in part using voice recognition software. While efforts 

are made to proofread this document, sound alike and grammatical errors may 

occur.





Departure





- Departure


Disposition: ED Place in Observation


Clinical Impression: 


 Symptomatic anemia





GI bleed


Qualifiers:


 GI bleed type/associated pathology: melena Qualified Code(s): K92.1 - Melena





Condition: Stable


Discharge Date/Time: 03/07/23 16:03

## 2023-03-07 NOTE — CT REPORT
PROCEDURE:  ABDOMEN/PELVIS W

 

INDICATIONS:  lower abd pain, GI bleed, celiac disease

 

CONTRAST: Omni 300 100 

 

TECHNIQUE:  

After the administration of intravenous contrast, 5 mm thick sections acquired from the diaphragms to
 the symphysis.  5 mm thick coronal and sagittal reformats were acquired.  For radiation dose reducti
on, the following was used:  automated exposure control, adjustment of mA and/or kV according to ila
ent size.  

 

COMPARISON:  None.

 

FINDINGS:  

Image quality:  Excellent.  

 

Lung bases:There is a subpleural nodule posteriorly in the right lower lobe measuring up to 0.6 cm o
n series 4 image 12. Mild dependent atelectasis demonstrated bilaterally. There small calcified nodul
es within the right lower lobe on series 4 image 5 which represents sequelae of old granulomatous dis
ease. Newly within the right lower lobe, there is also a bony nodule measuring up to 0.5 cm in series
 4 image 8.

Heart: Heart is normal in size. There are calcified lymph nodes along the esophagus in the inferior 
mediastinum consistent with sequelae of old granulomatous disease.

 

ABDOMEN:

Liver: No mass lesion.

Gallbladder: Within normal limits without calcified gallstones.

Biliary ducts: No biliary ductal dilatation.

Pancreas: Unremarkable.

Spleen: Normal in size. Multiple foci of splenic calcifications are redemonstrated consistent sequel
ae of old granulomatous disease.

Adrenal Glands: No adrenal nodules.

Kidneys and Ureters: No hydronephrosis.

 

Stomach and Bowel: Stomach, small bowel loops, and colon are normal in caliber and wall thickness. N
o pericecal plantar changes to suggest appendicitis. There is colonic diverticulosis without acute di
verticulitis.

 

Peritoneum: No abnormal intraperitoneal fluid. No free air.

 

Ventral Wall:  No hernia.

Abdominal Nodes:There are calcified abdominal mesenteric lymph nodes consistent sequelae of old gran
ulomatous disease.

Vessels: Aorta and inferior vena cava are normal in size.

 

PELVIS:

Pelvic Organs: Unremarkable.

Bladder: Unremarkable.

Pelvic Nodes: No enlarged lymph nodes.

Miscellaneous: No inguinal hernias.

 

Bones: There are postsurgical changes status post posterior fixation at L5-S1 with bilateral pedicle 
screws at these levels. Visualized osseous structures demonstrate no suspicious lesions. 

 

 

IMPRESSION:  

 

1. No definite acute intra-abdominal abnormality to correlate with patient's symptoms.

 

2. Colonic diverticulosis without acute diverticulitis.

 

Reviewed by: Jimbo Leos MD on 3/7/2023 11:02 PM PST

Approved by: Jimbo Leos MD on 3/7/2023 11:02 PM [facility]

 

 

Station ID:  [id number]

## 2023-03-07 NOTE — EXTERNAL MEDICAL SUMMARY RPT
Continuity of Care Document

                            Created on:2023



Patient:JANINA WAGNER

Sex:Female

:1945

External Reference #:[id number]





Demographics







                          Address                   [address] [place] [address]

 

                          Phone                     Unavailable

 

                          Preferred Language        English

 

                          Marital Status            

 

                          Episcopal Affiliation     Unknown

 

                          Race                      Unknown

 

                          Ethnic Group              Not  or 









Author







                          Organization              [facility]

 

                          [place] [address]

 

                          Phone                     [phone]









Allergies

No information.



Encounters

No information.



Functional Status

No information.



Immunizations

No information.



Medications

No information.



Problems







                     date                description         facility

 

                     2023 10:31    Unspecified abdominal pain  [facility]

ital

 

                     2023 15:31    Unspecified abdominal pain  [facility]

ital







Procedures

No information.



Results/Labs







           test      date      author    facility  value     unit      interpret

ation









                                         Result panel 1









           (unknown)  (no       (unknown)  (unknown)  (no value)  (units    (unk

nown)



                    date)                                   unknown)  

 

           (unknown)  (no       (unknown)  (unknown)  [id number]  (units    (unkn

own)



                    date)                                   unknown)  

 

           (unknown)  (no       (unknown)  (unknown)  23  (units    (unkno

wn)



                    date)                                   unknown)  

 

           (unknown)  (no       (unknown)  (unknown)  [address] [place] [address]  (units  

  (unknown)



                    date)                                   unknown)  

 

           (unknown)  (no       (unknown)  (unknown)  9.6 cm long;  (units    (u

nknown)



                    date)                                   unknown)  

 

           (unknown)  (no       (unknown)  (unknown)  Accession Number:  (units 

   (unknown)



                    date)                         [id number] unknown)  

 

           (unknown)  (no       (unknown)  (unknown)  Age/Sex: 77 / F  (units   

 (unknown)



                    date)                         Date of Service: unknown)  

 

           (unknown)  (no       (unknown)  (unknown)  [place]  (units    (

unknown)



                    date)                         [id number]     unknown)  

 

           (unknown)  (no       (unknown)  (unknown)  Aorta: Visualized  (units 

   (unknown)



                    date)                         aorta is normal in unknown)  



                                                  caliber at less           



                                                  than 3 cm.           

 

           (unknown)  (no       (unknown)  (unknown)  Approved by:  (units    (u

nknown)



                    date)                         ann Amador)  



                                                  M.D. on 2023           



                                                  at 11:42            

 

           (unknown)  (no       (unknown)  (unknown)  Biliary ducts:  (units    

(unknown)



                    date)                         Intrahepatic bile unknown)  



                                                  ducts are           



                                                  non-dilated.           



                                                  Extrahepatic bile           

 

           (unknown)  (no       (unknown)  (unknown)  COMPARISON: None.  (units 

   (unknown)



                    date)                                   unknown)  

 

           (unknown)  (no       (unknown)  (unknown)  : 1945  (units   

 (unknown)



                    date)                         Acct:[id number] unknown)  

 

           (unknown)  (no       (unknown)  (unknown)  Dictated by:  (units    (u

nknown)



                    date)                         Alex Silver unknown)  



                                                  M.D. on 2023           



                                                  at 11:37            

 

           (unknown)  (no       (unknown)  (unknown)  FINDINGS:  (units    (unkn

own)



                    date)                                   unknown)  

 

           (unknown)  (no       (unknown)  (unknown)  Gallbladder: No  (units   

 (unknown)



                    date)                         gallstones, unknown)  



                                                  gallbladder wall           



                                                  thickening, or           



                                                  sonographic [place]           

 

           (unknown)  (no       (unknown)  (unknown)  IMPRESSION:  (units    (un

known)



                    date)                                   unknown)  

 

           (unknown)  (no       (unknown)  (unknown)  INDICATIONS:  (units    (u

nknown)



                    date)                         ABDOMINAL PAIN unknown)  

 

           (unknown)  (no       (unknown)  (unknown)  IVC: Intrahepatic  (units 

   (unknown)



                    date)                         inferior vena cava unknown)  



                                                  is patent.           

 

           (unknown)  (no       (unknown)  (unknown)  Iliacs: Proximal  (units  

  (unknown)



                    date)                         common iliac unknown)  



                                                  arteries are           



                                                  normal in caliber           



                                                  at less than 2.5           

 

           (unknown)  (no       (unknown)  (unknown)  [facility]  (units   

 (unknown)



                    date)                                   unknown)  

 

           (unknown)  (no       (unknown)  (unknown)  Kidneys: Kidneys  (units  

  (unknown)



                    date)                         are normal in size unknown)  



                                                  and echotexture.           



                                                  Right kidney           



                                                  measures            

 

           (unknown)  (no       (unknown)  (unknown)  Liver: Liver is  (units   

 (unknown)



                    date)                         normal in size and unknown)  



                                                  homogeneous in           



                                                  echotexture. Main           



                                                  portal              

 

           (unknown)  (no       (unknown)  (unknown)  Loc: US   (units    (unkno

wn)



                    date)                                   unknown)  

 

           (unknown)  (no       (unknown)  (unknown)  Miscellaneous: No  (units 

   (unknown)



                    date)                         free abdominal unknown)  



                                                  fluid.              

 

           (unknown)  (no       (unknown)  (unknown)  No cholelithiasis  (units 

   (unknown)



                    date)                         or evidence of unknown)  



                                                  acute               



                                                  cholecystitis.           

 

           (unknown)  (no       (unknown)  (unknown)  No hydronephrosis  (units 

   (unknown)



                    date)                         or evidence of unknown)  



                                                  nephrolithiasis.           

 

           (unknown)  (no       (unknown)  (unknown)  Ordering  (units    (unkno

wn)



                    date)                         Provider: unknown)  



                                                  Reena Mack           

 

           (unknown)  (no       (unknown)  (unknown)  PROCEDURE: US  (units    (

unknown)



                    date)                         ABDOMEN COMPLETE unknown)  

 

           (unknown)  (no       (unknown)  (unknown)  Pancreas:  (units    (unkn

own)



                    date)                         Visualized unknown)  



                                                  portions of the           



                                                  pancreas are           



                                                  sonographically           



                                                  normal.             

 

           (unknown)  (no       (unknown)  (unknown)  Patient:  (units    (unkno

wn)



                    date)                         Janina Wagner unknown)  



                                                  MR#: [id number]              

 

           (unknown)  (no       (unknown)  (unknown)  Procedure: US  (units    (

unknown)



                    date)                         abdomen complete unknown)  

 

           (unknown)  (no       (unknown)  (unknown)  Real-time  (units    (unkn

own)



                    date)                         scanning was unknown)  



                                                  performed of the           



                                                  abdominal and           



                                                  retroperitoneal           



                                                  organs,             

 

           (unknown)  (no       (unknown)  (unknown)  Signed    (units    (unkno

wn)



                    date)                                   unknown)  

 

           (unknown)  (no       (unknown)  (unknown)  Spleen: Spleen is  (units 

   (unknown)



                    date)                         normal in size and unknown)  



                                                  homogeneous in           



                                                  echotexture.           

 

           (unknown)  (no       (unknown)  (unknown)  TECHNIQUE:  (units    (unk

nown)



                    date)                                   unknown)  

 

           (unknown)  (no       (unknown)  (unknown)  Ultrasound Report  (units 

   (unknown)



                    date)                                   unknown)  

 

           (unknown)  (no       (unknown)  (unknown)  cm.       (units    (unkno

wn)



                    date)                                   unknown)  

 

           (unknown)  (no       (unknown)  (unknown)  documentation.  (units    

(unknown)



                    date)                                   unknown)  

 

           (unknown)  (no       (unknown)  (unknown)  duct caliber  (units    (u

nknown)



                    date)                                   unknown)  

 

           (unknown)  (no       (unknown)  (unknown)  left kidney  (units    (un

known)



                    date)                         measures 9.2 cm unknown)  



                                                  long. No            



                                                  hydronephrosis or           



                                                  nephrolithiasis.           



                                                  No                  

 

           (unknown)  (no       (unknown)  (unknown)  masses. 11 mm  (units    (

unknown)



                    date)                         renal sinus cyst unknown)  



                                                  right kidney.           

 

           (unknown)  (no       (unknown)  (unknown)  measures 4 mm.  (units    

(unknown)



                    date)                         Normal is 6-7 mm unknown)  



                                                  or less in           



                                                  diameter, or 10 mm           



                                                  or less             

 

           (unknown)  (no       (unknown)  (unknown)  patent with  (units    (un

known)



                    date)                         antegrade flow. unknown)  

 

           (unknown)  (no       (unknown)  (unknown)  post-cholecystect  (units 

   (unknown)



                    date)                         misti.      unknown)  

 

           (unknown)  (no       (unknown)  (unknown)  sign.     (units    (unkno

wn)



                    date)                                   unknown)  

 

           (unknown)  (no       (unknown)  (unknown)  solid     (units    (unkno

wn)



                    date)                                   unknown)  

 

           (unknown)  (no       (unknown)  (unknown)  vein is   (units    (unkno

wn)



                    date)                                   unknown)  

 

           (unknown)  (no       (unknown)  (unknown)  with image  (units    (unk

nown)



                    date)                                   unknown)  







Social History

No information.



Vital Signs

No information.

## 2023-03-07 NOTE — PHARMACY PROGRESS NOTE
- Best Possible Medication History


Admit Date and Time: 03/07/23 1539


Processed by: Nursing


Medication History completed: Yes


Secondary Source(s): Insurance records





As the person ultimately responsible for medication therapy, providers are able 

to order a medication from an existing home medication list in [facility] via the 

"Reconcile Routine" prior to Confirmation of that medication by support staff. 

Such practice is discouraged except when the physician, in their clinical 

judgment, deems that a medical need exists for a medication without regard to 

previous use.

## 2023-03-08 VITALS — SYSTOLIC BLOOD PRESSURE: 139 MMHG | DIASTOLIC BLOOD PRESSURE: 77 MMHG

## 2023-03-08 LAB
ANION GAP SERPL CALCULATED.4IONS-SCNC: 7 MMOL/L (ref 6–13)
BUN SERPL-MCNC: 14 MG/DL (ref 6–20)
CALCIUM UR-MCNC: 8.5 MG/DL (ref 8.5–10.3)
CHLORIDE SERPL-SCNC: 110 MMOL/L (ref 101–111)
CO2 SERPL-SCNC: 25 MMOL/L (ref 21–32)
CREAT SERPLBLD-SCNC: 0.8 MG/DL (ref 0.4–1)
GFRSERPLBLD MDRD-ARVRAT: 69 ML/MIN/{1.73_M2} (ref 89–?)
GLUCOSE SERPL-MCNC: 110 MG/DL (ref 70–100)
POTASSIUM SERPL-SCNC: 3.9 MMOL/L (ref 3.5–5)
SODIUM SERPLBLD-SCNC: 142 MMOL/L (ref 135–145)

## 2023-03-08 PROCEDURE — 0DB78ZX EXCISION OF STOMACH, PYLORUS, VIA NATURAL OR ARTIFICIAL OPENING ENDOSCOPIC, DIAGNOSTIC: ICD-10-PCS | Performed by: SURGERY

## 2023-03-08 PROCEDURE — 0DB98ZX EXCISION OF DUODENUM, VIA NATURAL OR ARTIFICIAL OPENING ENDOSCOPIC, DIAGNOSTIC: ICD-10-PCS | Performed by: SURGERY

## 2023-03-08 RX ADMIN — SODIUM CHLORIDE SCH MG: 9 INJECTION, SOLUTION INTRAVENOUS at 10:05

## 2023-03-08 RX ADMIN — SODIUM CHLORIDE, PRESERVATIVE FREE SCH: 5 INJECTION INTRAVENOUS at 17:08

## 2023-03-08 RX ADMIN — SODIUM CHLORIDE, PRESERVATIVE FREE SCH: 5 INJECTION INTRAVENOUS at 06:17

## 2023-03-08 RX ADMIN — SODIUM CHLORIDE, PRESERVATIVE FREE SCH: 5 INJECTION INTRAVENOUS at 10:04

## 2023-03-08 NOTE — DISCHARGE SUMMARY
Discharge Summary


Admit Date: 03/07/23


Discharge Date: 03/08/23


Discharging Provider: Dr Ana Montenegro


Primary Care Provider: RUTHY Mack


Code Status: Attempt Resuscitation


Condition at Discharge: Stable


Discharge Disposition: 01 Home, Self Care





- HPI


History of Present Illness: 





This is a 77-year-old white female with a history of celiac disease for which 

she is on a gluten-free diet and has a history of 5 back surgeries done 6 years 

ago which left her with low back pain radiating to the left buttock and down her

posterior left leg and also numbness of the lateral left hip area radiating 

across her left thigh to her medial left knee and down the front lower leg and 

dorsal foot.  She used to be a  and  and had 

to lose her job because of the problems with her back and left leg.


2 days ago she choked on something and had to push against a countertop to expel

the food.  She has been short of breath for the past 3 days and reported "panic 

attacks" that could last an hour.  She came to a walk-in clinic with these 

complaints and was told to go to the ER.  In the ED her labs show a hemoglobin 

of 7.3 and on review of systems she was asked about her bowel movements.  She 

reported that they have been black for about 4 weeks.  She also reports 

up-and-down lower abdominal pain with gassy feelings and cramping but no 

diarrhea.  She denies any nausea and vomiting.  The ED provider has reached out 

to general surgery and an EGD is planned for tomorrow.  The ED provider reached 

out to me on the Hospitalist Team and we discussed her management going forward.

 The patient will be placed in Observation status and evaluated for abdominal 

pain, symptomatic anemia probably causing the shortness of breath and evaluated 

for cause for black stools.








- HOSPITAL COURSE


Hospital Course: 





(1) Symptomatic anemia


Very likely her dyspnea on exertion was due to her severe anemia. Her last 

hemoglobin done here was 2 years ago and was 15, at presenytation it was 7.3. 

Her hemoglobin was followed several times a day, when it was 6.9, she got 1 Unit

of blood transfused. Hgb at discharge was 8.3. She was sent home on daily iron 

tablet.





(2) Gasrtric erosions


Her 4 weeks of black stools suggested an upper GI source of blood loss. She was 

started on empiric treatment for ulcer and had an EGD, which found several small

gastric eroisins. Possibly her NSAID use and high coffee intake may have led to 

these. She was sent home to take 1 month of Protonix BID, and told to stop her 

previous Omeprazole.





(3) Abdominal pain


Because this lower abd pain is recently worse, and it is accompanied with new 

black bowel movements, she had a CT abdomen and pelvis. This was unremarkable. 

Very likely her pain with gassy bloating is from her celiac disease.





(4) Celiac disease


As per history. A gluten-free diet was started when she was put on a solids 

diet. Also a biopsy was done during EGD 





(5) Low back pain potentially associated with radiculopathy


This has been a complaint for several years.  She has had PT and has had 

improvement in numbness but no improvement in pain of the lower back and left 

leg.


PT eval was done and the therapist suspected she may also have claudication. An 

outpatient work-up with arterial Doppler of the L leg and CAMILLE test are 

recommended.








- ALLERGIES


Allergies/Adverse Reactions: 


                                    Allergies











Allergy/AdvReac Type Severity Reaction Status Date / Time


 


adhesive tape Allergy  Rash Verified 03/07/23 11:08


 


gluten Allergy  Cramps Verified 03/08/23 07:52


 


nitrofurantoin Allergy  Unknown Verified 03/07/23 11:11


 


Sulfa (Sulfonamide Allergy  Unknown Verified 03/07/23 11:08





Antibiotics)     


 


acetaminophen [From Tylenol] AdvReac  Nausea Verified 03/07/23 11:08


 


ibuprofen AdvReac  Nausea Verified 03/07/23 11:08














- MEDICATIONS


Home Medications: 


                                Ambulatory Orders











 Medication  Instructions  Recorded  Confirmed


 


RX: Carisoprodol [Soma] 350 mg ORAL QPM PRN 03/07/23 03/07/23


 


RX: Cetirizine [ZyrTEC] 10 mg PO DAILY 03/07/23 03/07/23


 


RX: Dicyclomine [Bentyl] 10 mg PO DAILY PRN 03/07/23 03/07/23


 


RX: Gabapentin [Neurontin] 300 mg PO HS 03/07/23 03/07/23


 


RX: Venlafaxine ER [Effexor ER] 75 mg PO QPM 03/07/23 03/07/23


 


Pantoprazole [Protonix] 40 mg PO BID #60 tablet 03/08/23 


 


RX: Ferrous Gluconate [Fergon] 324 mg PO DAILY #30 tab 03/08/23 














- PHYSICAL EXAM AT DISCHARGE


General Appearance: positive: No acute distress, Alert


Eyes Bilateral: positive: Normal inspection, EOMI


ENT: positive: ENT inspection nml, No signs of dehydration


Neck: positive: Nml inspection, No JVD


Respiratory: positive: No respiratory distress, Breath sounds nml


Cardiovascular: positive: Regular rate & rhythm, No murmur


Abdomen: positive: Nml bowel sounds, No distention, Other (Mild tenderness to 

moderate palpation in the left lower quadrant and right lower quadrant)


Skin: positive: Color nml, Warm, Dry


Extremities: positive: Non-tender (at rest), No pedal edema


Neurologic/Psychiatric: positive: Oriented x3, Motor nml





- LABS


Result Diagrams: 


                                 03/08/23 15:28





                                 03/08/23 06:31





- DIAGNOSTIC IMAGING


Diagnostic Imaging Results: Final report reviewed





- FOLLOW UP


Follow Up: 





See PCP in 1-2 weeks for a hopsital F/U office visit.  Have appointment to new 

Gastroenterologist.








- TIME SPENT


Time Spent in Discharge (Minutes): 35

## 2023-03-08 NOTE — OPERATIVE REPORT
Operative Report





- General


Admit Date: 03/07/23


Procedure Date: 03/08/23


Planned Procedure: 





egd


Pre-Op Diagnosis: anemia


Procedure Performed: 





egd with biopsies


Post Op Diagnosis: few very small erosions upper body stomach otherwise normal 

egd





- Procedure Note


Primary Surgeon: marcelo hernandez


Anesthesia Technique: MAC


Pathology: 





antrum and duodenum


Estimated Blood Loss (mL): 0


Indications: 





anemia


Findings: 





no bleeding, ulcers, cancer


Complications: 





none

## 2023-03-08 NOTE — ANESTHESIA POST OP EVALUATION
Anesthesia Post Eval





- Post Anesthesia Eval


Vitals: 





                                Last Vital Signs











Temp  36.9 C   03/08/23 07:56


 


Pulse  85   03/08/23 07:56


 


Resp  17   03/08/23 07:56


 


BP  101/54 L  03/08/23 07:56


 


Pulse Ox  98   03/08/23 07:56


 


O2 Flow Rate  13   03/07/23 16:24











CV Function Including HR & BP: Stable


Pain Control: Satisfactory


Nausea & Vomiting: Negative


Mental Status: Baseline


Respiratory Status: Airway Patent


Hydration Status: Satisfactory


Anesthesia Complications: None

## 2023-03-08 NOTE — DISCHARGE PLAN
Discharge Plan


Problem Reviewed?: Yes


Disposition: 01 Home, Self Care


Condition: Stable


Prescriptions: 


Ferrous Gluconate [Fergon] 324 mg PO DAILY #30 tab


Pantoprazole [Protonix] 40 mg PO BID #60 tablet


Diet: Regular (Celiac diet)


Activity Restrictions: Activity as Tolerated


Shower Restrictions: No


Driving Restrictions: No


Weight Bearing: Full Weight


Health Concerns: 


You were hospitalized after finding severe anemia.  You received a blood 

transfusion.  You have been started on oral iron replacement.  You had an upper 

endoscopy to look for source of bleeding done by Dr. Manrique.  The findings were:

 several small gastric erosions.  This could possibly be from using NSAIDs (like

 Aleve) and drinking alot of coffee.  You should no longer use NSAIDs for the 

next 1 month. You should decrease your coffee intake to 2 cups per day max. A 

new prescription to help heal the erosions was also ordered, Protonix twice a 

day for a month.  All new prescriptions were electronically sent to your 

[facility] pharmacy in [place]. 





You were evaluated with a CT scan because of the lower abdominal pain and this 

was essentially unremarkable.  A copy of that CT result is being provided to you

 now.





You were seen by physical therapy to evaluate the pain in your spine, left hip 

and numbness of the left hip and leg.  The physical therapist suggested that you

 may also have claudication (poor blood flow to the leg), which may be causing 

some of the pain symptoms that you get with activity.  This would need an 

outpatient work-up (Duplex Doppler of the arteries of your left leg and/or CAMILLE 

(ankle-brachial index test). These tests need to be ordered by your PCP.





See your primary care provider soon for hospital follow-up visit, to have a 

blood test to check your anemia and see if you need iron or other replacement 

prescriptions, and to have a referral to Gastroenterology.





Plan of Treatment: 


As above.





Care Goals: 


Improvement in symptoms and stabilization of the goals.





Assessment: 


The patient, and daughter at bedside, understand and are agreeable with the 

plan.





Additional Instructions or Follow Up instructions: 


If you have new or worsening symptoms, call your PCP or Gastroenterologist for 

advice, or come to the ER.





No Smoking: If you smoke, Please STOP!  Call [phone] for help.


Follow-up with: 


CHNYA VILLA ARNP [Primary Care Provider] -

## 2023-03-08 NOTE — ANESTHESIA
Pre-Anesthesia VS, & Labs





- Diagnosis





anemia





- Procedure





EGD


Vital Signs: 





                                        











Temp Pulse Resp BP Pulse Ox O2 Flow Rate


 


 36.9 C   85   17   101/54 L  98   13 


 


 03/08/23 07:56  03/08/23 07:56  03/08/23 07:56  03/08/23 07:56  03/08/23 07:56 

03/07/23 16:24











Height: 5 ft 3 in


Weight (kg): 72.5 kg


Body Mass Index: 28.3


BMI Classification: Overweight





- NPO


>8 hours





- Pregnancy


Is Patient Pregnant?: No





- Lab Results


Current Lab Results: 





Laboratory Tests





03/08/23 06:31: Sodium 142, Potassium 3.9, Chloride 110, Carbon Dioxide 25, 

Anion Gap 7.0, BUN 14, Creatinine 0.8, Estimated GFR (MDRD) 69 L, Glucose 110 H,

Calcium 8.5


03/08/23 06:31: Hgb 6.9 L*


03/07/23 17:00: Hgb 7.0 L*


03/07/23 15:28: Blood Type O POSITIVE, Antibody Screen NEGATIVE


03/07/23 14:53: B-Natriuretic Peptide 47


03/07/23 14:53: Troponin I High Sens 4.6


03/07/23 14:53: Sodium 139, Potassium 3.6, Chloride 107, Carbon Dioxide 24, 

Anion Gap 8.0, BUN 23 H, Creatinine 0.8, Estimated GFR (MDRD) 69 L, Glucose 121 

H, Calcium 8.7, Total Bilirubin 0.4, AST 21, ALT 15, Alkaline Phosphatase 64, 

Total Protein 6.3 L, Albumin 3.4, Globulin 2.9, Albumin/Globulin Ratio 1.2


03/07/23 14:53: WBC 8.7, RBC 2.87 L, Hgb 7.3 L, Hct 24.6 L, MCV 85.7, MCH 25.4 L

, MCHC 29.7 L, RDW 16.2 H, Plt Count 280, MPV 9.5, Neut # (Auto) 5.5, Lymph # (A

uto) 1.8, Mono # (Auto) 0.8, Eos # (Auto) 0.4, Baso # (Auto) 0.1, Absolute 

Nucleated RBC 0.00, Nucleated RBC % 0.0


03/07/23 14:30: Blood Type Recheck O POSITIVE








Fish Bones: 


                                 03/08/23 06:31





                                 03/08/23 06:31





Home Medications and Allergies


Home Medications: 


Ambulatory Orders





Carisoprodol [Soma] 350 mg ORAL QPM PRN 03/07/23 


Cetirizine [ZyrTEC] 10 mg PO DAILY 03/07/23 


Dicyclomine [Bentyl] 10 mg PO DAILY PRN 03/07/23 


Gabapentin [Neurontin] 300 mg PO HS 03/07/23 


Omeprazole Magnesium [Prilosec] 10 mg PO DAILY 03/07/23 


Venlafaxine ER [Effexor ER] 75 mg PO QPM 03/07/23 











Active Medications





Cetirizine HCl (Cetirizine 10 Mg Tablet)  10 mg PO DAILY [facility]


Dicyclomine HCl (Dicyclomine 10 Mg Capsule)  10 mg PO DAILY PRN


   PRN Reason: Abdominal Pain


Gabapentin (Gabapentin 400 Mg Capsule)  400 mg PO [facility]


   Last Admin: 03/07/23 22:54 Dose:  400 mg


   


Sodium Chloride (Normal Saline 0.9%)  1,000 mls @ 40 mls/hr IV .Q25H [facility]


   Last Infusion: 03/08/23 06:47 Dose:  40 mls/hr


   


Ondansetron HCl (Ondansetron 4 Mg/2 Ml Vial)  4 mg IVP Q6HR PRN


   PRN Reason: Nausea / Vomiting


Pantoprazole Sodium (Pantoprazole 40 Mg Vial)  40 mg IVP BID [facility]


   Last Admin: 03/07/23 21:10 Dose:  40 mg


   


Sodium Chloride (Sodium Chloride Flush 0.9% 10 Ml Syringe)  10 ml IVP PRN PRN


   PRN Reason: AS NEEDED PER PROVIDER ORDERS


Sodium Chloride (Sodium Chloride Flush 0.9% 10 Ml Syringe)  10 ml IVP 

0100,0900,1700 [facility]


   Last Admin: 03/08/23 06:17 Dose:  Not Given


   


Venlafaxine HCl (Venlafaxine Er 75 Mg Capsule)  75 mg PO QPM [facility]


   Last Admin: 03/07/23 22:54 Dose:  75 mg


   





                                        





Carisoprodol [Soma] 350 mg ORAL QPM PRN 03/07/23 


Cetirizine [ZyrTEC] 10 mg PO DAILY 03/07/23 


Dicyclomine [Bentyl] 10 mg PO DAILY PRN 03/07/23 


Gabapentin [Neurontin] 300 mg PO HS 03/07/23 


Omeprazole Magnesium [Prilosec] 10 mg PO DAILY 03/07/23 


Venlafaxine ER [Effexor ER] 75 mg PO QPM 03/07/23 








Allergies/Adverse Reactions: 


                                    Allergies











Allergy/AdvReac Type Severity Reaction Status Date / Time


 


adhesive tape Allergy  Rash Verified 03/07/23 11:08


 


gluten Allergy  Cramps Verified 03/08/23 07:52


 


nitrofurantoin Allergy  Unknown Verified 03/07/23 11:11


 


Sulfa (Sulfonamide Allergy  Unknown Verified 03/07/23 11:08





Antibiotics)     


 


acetaminophen [From Tylenol] AdvReac  Nausea Verified 03/07/23 11:08


 


ibuprofen AdvReac  Nausea Verified 03/07/23 11:08














Anes History & Medical History





- Anesthetic History


Anesthesia Complications: reports: No previous complications


Family history of Anesthesia Complications: Denies


Family history of Malignant Hyperthermia: Denies





- Medical History


Cardiovascular: reports: None


Pulmonary: reports: Pneumonia


Gastrointestinal: reports: None


Urinary: reports: None


Musculoskeletal: reports: None


Endocrine/Autoimmune: reports: None


Skin: reports: None


Smoking Status: Never smoker


Other Past Medical History: NERVE PAIN, CELIAC





- Surgical History


General: reports: Appendectomy


Eyes Ears Nose Throat (EENT): reports: Cataracts


Gynecologic: reports: Hysterectomy


Orthopedic: reports: Arthroscopic surgery, Carpal Tunnel surgery





Exam


General: Alert, Oriented x3, Cooperative


Dental: WNL


Mouth Opening: 3 Fingerbreadth


Neck Mobility: Normal


Mallampati classification: II


Thyromental Distance: 4-6 cm


Respiratory: Lungs clear


Cardiovascular: Regular rate





Plan


Anesthesia Type: General, Total IV


Consent for Procedure(s) Verified and Reviewed: Yes


Code Status: Attempt Resuscitation


ASA classification: 3-Severe systemic disease


Is this case an emergency?: No

## 2023-04-18 ENCOUNTER — HOSPITAL ENCOUNTER (OUTPATIENT)
Dept: HOSPITAL 76 - LAB.N | Age: 78
Discharge: HOME | End: 2023-04-18
Attending: SURGERY
Payer: MEDICARE

## 2023-04-18 DIAGNOSIS — R91.8: Primary | ICD-10-CM

## 2023-04-18 LAB
BASOPHILS NFR BLD AUTO: 0.1 10^3/UL (ref 0–0.1)
BASOPHILS NFR BLD AUTO: 1 %
EOSINOPHIL # BLD AUTO: 0.3 10^3/UL (ref 0–0.7)
EOSINOPHIL NFR BLD AUTO: 3.2 %
ERYTHROCYTE [DISTWIDTH] IN BLOOD BY AUTOMATED COUNT: 22 % (ref 12–15)
HCT VFR BLD AUTO: 38.2 % (ref 37–47)
HGB UR QL STRIP: 11 G/DL (ref 12–16)
LYMPHOCYTES # SPEC AUTO: 1.2 10^3/UL (ref 1.5–3.5)
LYMPHOCYTES NFR BLD AUTO: 13.4 %
MCH RBC QN AUTO: 25.1 PG (ref 27–31)
MCHC RBC AUTO-ENTMCNC: 28.8 G/DL (ref 32–36)
MCV RBC AUTO: 87 FL (ref 81–99)
MONOCYTES # BLD AUTO: 1.1 10^3/UL (ref 0–1)
MONOCYTES NFR BLD AUTO: 12 %
NEUTROPHILS # BLD AUTO: 6.4 10^3/UL (ref 1.5–6.6)
NEUTROPHILS # SNV AUTO: 9.2 X10^3/UL (ref 4.8–10.8)
NEUTROPHILS NFR BLD AUTO: 69.9 %
NRBC # BLD AUTO: 0 /100WBC
NRBC # BLD AUTO: 0 X10^3/UL
PDW BLD AUTO: 10.1 FL (ref 7.9–10.8)
PLAT MORPH BLD: (no result)
PLATELET # BLD: 420 10^3/UL (ref 130–450)
PLATELET BLD QL SMEAR: (no result)
RBC MAR: 4.39 10^6/UL (ref 4.2–5.4)
RBC MORPH BLD: (no result)

## 2023-04-18 PROCEDURE — 36415 COLL VENOUS BLD VENIPUNCTURE: CPT

## 2023-04-18 PROCEDURE — 85025 COMPLETE CBC W/AUTO DIFF WBC: CPT
